# Patient Record
Sex: FEMALE | Race: WHITE | Employment: UNEMPLOYED | URBAN - METROPOLITAN AREA
[De-identification: names, ages, dates, MRNs, and addresses within clinical notes are randomized per-mention and may not be internally consistent; named-entity substitution may affect disease eponyms.]

---

## 2023-01-30 ENCOUNTER — HOSPITAL ENCOUNTER (OUTPATIENT)
Dept: PHYSICAL THERAPY | Age: 53
Discharge: HOME | End: 2023-01-30
Payer: MEDICAID

## 2023-01-30 PROCEDURE — 97162 PT EVAL MOD COMPLEX 30 MIN: CPT

## 2023-01-30 NOTE — PROGRESS NOTES
PT DAILY TREATMENT NOTE     Patient Name: Dea Lewis  Date:2023  : 1970  [x]  Patient  Verified  Payor: [employer] MEDICARE / Plan: [id number] HMO / Product Type: Managed Care Medicare /    In time: 1:15  Out time: 1:45  Total Treatment Time (min): 30  Visit #: 1 of 10    Medicare/[facility] Time Tracking (below)   Total Timed Codes (min):  n/a 1:1 Treatment Time:  n/a       Treatment Area: Lower back pain [M54.50]    SUBJECTIVE  Pain Level (0-10 scale): 8/10  Any medication changes, allergies to medications, adverse drug reactions, diagnosis change, or new procedure performed?: [] No    [x] Yes (see summary sheet for update)  Subjective functional status/changes:    See Initial Eval    OBJECTIVE    10  (NC) min Self Care:  patient educated in activity modification to avoid symptom exacerbation   Rationale: improved body mechanics to reduce left radiculopathy          Billed With/As:   [] TE   [] TA   [] Neuro   [x] Self Care Patient Education: [x] Review HEP    [] Progressed/Changed HEP based on:   [] positioning   [] body mechanics   [] transfers   [] heat/ice application        Other Objective/Functional Measures:   See Initial Evaluation    Pain Level (0-10 scale) post treatment: 8/10    ASSESSMENT  Changes in Function:   See Initial Eval    Patient will continue to benefit from skilled PT services to modify and progress therapeutic interventions, address functional mobility deficits, address ROM deficits, address strength deficits, analyze and address soft tissue restrictions, analyze and cue movement patterns, analyze and modify body mechanics/ergonomics, and assess and modify postural abnormalities to attain remaining goals.      [x]  See Plan of Care  []  See progress note/recertification  []  See Discharge Summary         Progress towards goals / Updated goals:  See Initial Eval    PLAN  [x]  Upgrade activities as tolerated     [x]  Continue plan of care  []  Update interventions per flow sheet       []  Discharge due to:_  []  Other:_      Rei Pennie, PT 1/30/2023  1:54 PM    No future appointments.

## 2023-01-30 NOTE — PROGRESS NOTES
[address] [facility] AT [address] [place], [place] [address] [address] [facility], [place] [address]  Phone: [phone] Fax: [phone] / STATEMENT OF MEDICAL NECESSITY FOR PHYSICAL THERAPY SERVICES  Patient Name: Ash Robertson : 1970   Medical   Diagnosis: Lower back pain [M54.50] Treatment Diagnosis: Left Lumbar Radiculopathy   Onset Date: 22     Referral Source: Angie Garcia DO Start of Care [facility]): 2023   Prior Hospitalization: See medical history Provider #: [id number]   Prior Level of Function: Hx intermittent LBP and prior left radiculopathy   Comorbidities: DM, HTN, OA, depression, fibromyalgia   Medications: Verified on Patient Summary List   The Plan of Care and following information is based on the information from the initial evaluation.   ========================================================================  Subjective Findings:  History/Mechanism of Injury: Ash Robertson is a 47 y.o.  yo female with Dx of Lower back pain [M54.50]. Symptom exacerbation upon waking on 22 left LBP and radiculopathy. Hx of sciatic pain prior. Increased stress with family illness and caring for mother. Patient First 10 days later with shot of Toradol. Unable to tolerate opioids due to history of addiction. Taking muscle relaxer currently. Diagnostic Tests: no recent imaging. Lumbar Xray 1 year ago; dx LS OA.   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, computer work/writing    Objective Findings:   Lumbar AROM:  Flexion Limited 50%   Extension Unable to achieve neutral spine Right Lateral Flexion Limited 50%   Left Lateral Flexion Limited 75%   Right Rotation Limited 50%   Left Rotation Limited 50%     Strength:  Manual Muscle Testing: Right Left   Hip Flexion 4+/5 4+/5   Knee Extension 4+/5 4+/5   Knee Flexion 4+/5 4+/5   Ankle Dorsiflexion 4+/5 4+/5   Ankle Plantarflexion 4+/5 4+/5   Ankle Eversion 4+/5 4+/5   Great Toe Extension NT NT   Hip Abduction NT NT   Hip Adduction NT NT   Hip Extension NT NT   Hip External Rotation NT NT   Hip Internal Rotation NT NT     Posture: forward flexed trunk, guarded. Reflexes/Sensation: not assessed    Palpation: Increased tone bilateral lumbar paraspinals in sitting. Unable to palpate bony landmarks due to increased adipose tissue. Neural Screen Right Left   Slump - -   SLR - +      SI Screen Right Left   *Not assessed due to pain    Flexbility Right Left   *Not assessed due to pain    Bed Mobility: Patient unable to lie supine with legs extended. Unable to initial rolling due to pain. Mod assist to transfer from supine hook lying to sitting. Repeated Spinal Movement Testing: unable to tolerate due to complaints of severe pain    Gait: ambulating with decreased cash and step length and forward trunk lean with one crutch on left side    Balance: Not assessed due to pain    FOTO Score: 38 points. Clinical Impression: Signs and symptom consistent with nerve root impingement at L5. Unable to identify spinal flexion or extension bias due to pain. Poor tolerance to assessment due to complaints of severe radiculopathy. Unable to establish a position of comfort for symptom relief. Patient encouraged to follow up with MD before resuming PT to discuss benefits of medication to treat nerve inflammation and pain to allow for improved tolerance to PT interventions. Primary impairments include decreased spinal ROM, decreased strength, poor bed mobility and transfer status, gait deviations and severe pain.  Patient will benefit from continued skilled PT services to further improve following functional limitations: decreased tolerance to sitting, standing, walking and lying, limiting sleep and ability to complete ADLs and self care.  ========================================================================  Eval Complexity: History: HIGH Complexity :3+ comorbidities / personal factors will impact the outcome/ POC Exam:HIGH Complexity : 4+ Standardized tests and measures addressing body structure, function, activity limitation and / or participation in recreation  Presentation: MEDIUM Complexity : Evolving with changing characteristics  Clinical Decision Making:MEDIUM Complexity : FOTO score of 26-74Overall Complexity:LOW   Problem List: pain affecting function, decrease ROM, decrease strength, impaired gait/ balance, decrease ADL/ functional abilitiies, decrease activity tolerance, decrease flexibility/ joint mobility, and decrease transfer abilities  Vasopnuematic compression justification:  Per bilateral girth measures taken and listed above the edema is considered significant and having an impact on the patient's ADLs   Treatment Plan may include any combination of the following: Therapeutic exercise, Neuromuscular reeducation, Manual therapy, Therapeutic activity, Self care/home management, Electric stim unattended , and Mechanical traction  Patient / Family readiness to learn indicated by: asking questions, trying to perform skills, and interest  Persons(s) to be included in education: patient (P)  Barriers to Learning/Limitations: no  Measures taken:    Patient Goal (s): \"Relieve pain enough to function daily\"   Patient self reported health status: fair  Rehabilitation Potential: good  Short Term Goals: To be accomplished in  4  treatments:  Patient will report daily compliance with HEP to improve tolerance to ADLs. Status at last assessment: HEP to be issued second visit  Long Term Goals:  To be accomplished in  10  treatments:  Patient will report pain 3/10 at worst to improve tolerance to ADLs. Status at last assessment: pain 8/10 at worst  Patient will report 50% improvement in left LE radiculopathy to improve tolerance to ADLs. Status at last assessment: pain 8/10 average  Patient will increase FOTO Score to 50 points to improve tolerance to ADLs. Status at last assessment: FOTO Score 38 points  Frequency / Duration:   Patient to be seen  2  times per week for 10  treatments:   Patient / Caregiver education and instruction: self care  Therapist Signature: Jose Ronquillo PT Date: 5/44/4734   Certification Period: N/A Time: 1:07 PM   ========================================================================  I certify that the above Physical Therapy Services are being furnished while the patient is under my care. I agree with the treatment plan and certify that this therapy is necessary. Physician Signature:        Date:       Time: ___                                            Mignon Chung DO. Please sign and return to [facility] or you may fax the signed copy to [phone]. Thank you.